# Patient Record
Sex: MALE | Race: NATIVE HAWAIIAN OR OTHER PACIFIC ISLANDER | URBAN - METROPOLITAN AREA
[De-identification: names, ages, dates, MRNs, and addresses within clinical notes are randomized per-mention and may not be internally consistent; named-entity substitution may affect disease eponyms.]

---

## 2019-11-25 ENCOUNTER — OFFICE VISIT (OUTPATIENT)
Dept: URBAN - METROPOLITAN AREA CLINIC 17 | Facility: CLINIC | Age: 69
End: 2019-11-25
Payer: MEDICARE

## 2019-11-25 DIAGNOSIS — H26.491 OTHER SECONDARY CATARACT, RIGHT EYE: ICD-10-CM

## 2019-11-25 PROCEDURE — 92004 COMPRE OPH EXAM NEW PT 1/>: CPT | Performed by: OPHTHALMOLOGY

## 2019-11-25 ASSESSMENT — VISUAL ACUITY
OS: 20/40
OD: 20/40

## 2019-11-25 ASSESSMENT — INTRAOCULAR PRESSURE
OD: 10
OS: 11

## 2019-11-25 NOTE — IMPRESSION/PLAN
Impression: Puckering of macula, left eye: H35.372. Plan: Discussed diagnosis in detail with patient. Advised patient conditino can get worse after cataract surgery and will need to monitor closes after surgery. Will also have patient use steroid and NSAID for 6-8 weeks after surgery.

## 2019-11-25 NOTE — IMPRESSION/PLAN
Impression: Combined forms of age-related cataract, left eye: H25.812. Condition: established, worsening. Symptoms: could improve with surgery. Vision: vision affected. Plan: Cataract accounts for patient's complaints. Reviewed risks, benefits, and procedure. Patient desires surgery, schedule ce/iol OS, RL2, standard lens, distance refractive target, patient is clear for surgery in [place] [id number]. Advised pt he may have limited visual improvement in OS due to ERM in OS. Pt to use NSAID and steroid for 6-8 weeks after surgery.

## 2019-12-20 ENCOUNTER — PRE-OPERATIVE VISIT (OUTPATIENT)
Dept: URBAN - METROPOLITAN AREA CLINIC 17 | Facility: CLINIC | Age: 69
End: 2019-12-20
Payer: MEDICARE

## 2019-12-20 DIAGNOSIS — H25.812 COMBINED FORMS OF AGE-RELATED CATARACT, LEFT EYE: Primary | ICD-10-CM

## 2019-12-20 RX ORDER — OFLOXACIN 3 MG/ML
0.3 % SOLUTION/ DROPS OPHTHALMIC
Qty: 5 | Refills: 1 | Status: INACTIVE
Start: 2019-12-20 | End: 2021-01-01

## 2019-12-20 RX ORDER — PREDNISOLONE ACETATE 10 MG/ML
1 % SUSPENSION/ DROPS OPHTHALMIC
Qty: 10 | Refills: 1 | Status: INACTIVE
Start: 2019-12-20 | End: 2021-01-01

## 2019-12-20 RX ORDER — KETOROLAC TROMETHAMINE 5 MG/ML
0.5 % SOLUTION OPHTHALMIC
Qty: 1 | Refills: 1 | Status: INACTIVE
Start: 2019-12-20 | End: 2021-01-01

## 2019-12-20 ASSESSMENT — PACHYMETRY
OS: 2.33
OD: 3.12
OS: 23.86
OD: 23.39

## 2019-12-30 ENCOUNTER — SURGERY (OUTPATIENT)
Dept: URBAN - METROPOLITAN AREA SURGERY 7 | Facility: SURGERY | Age: 69
End: 2019-12-30
Payer: MEDICARE

## 2019-12-30 PROCEDURE — 66821 AFTER CATARACT LASER SURGERY: CPT | Performed by: OPHTHALMOLOGY

## 2020-01-06 ENCOUNTER — POST-OPERATIVE VISIT (OUTPATIENT)
Dept: URBAN - METROPOLITAN AREA CLINIC 17 | Facility: CLINIC | Age: 70
End: 2020-01-06

## 2020-01-06 DIAGNOSIS — Z09 ENCNTR FOR F/U EXAM AFT TRTMT FOR COND OTH THAN MALIG NEOPLM: Primary | ICD-10-CM

## 2020-01-06 PROCEDURE — 99024 POSTOP FOLLOW-UP VISIT: CPT | Performed by: OPTOMETRIST

## 2020-01-06 ASSESSMENT — VISUAL ACUITY: OD: 20/30

## 2020-01-06 ASSESSMENT — INTRAOCULAR PRESSURE
OD: 10
OS: 11

## 2020-01-13 ENCOUNTER — SURGERY (OUTPATIENT)
Dept: URBAN - METROPOLITAN AREA SURGERY 7 | Facility: SURGERY | Age: 70
End: 2020-01-13
Payer: MEDICARE

## 2021-01-01 ENCOUNTER — TESTING ONLY (OUTPATIENT)
Dept: URBAN - METROPOLITAN AREA CLINIC 17 | Facility: CLINIC | Age: 71
End: 2021-01-01

## 2021-01-01 ENCOUNTER — OFFICE VISIT (OUTPATIENT)
Dept: URBAN - METROPOLITAN AREA CLINIC 17 | Facility: CLINIC | Age: 71
End: 2021-01-01
Payer: MEDICARE

## 2021-01-01 DIAGNOSIS — H52.4 PRESBYOPIA: Primary | ICD-10-CM

## 2021-01-01 DIAGNOSIS — H35.372 PUCKERING OF MACULA, LEFT EYE: Primary | ICD-10-CM

## 2021-01-01 DIAGNOSIS — E11.3551 TYPE 2 DIABETES WITH STABLE PROLIF DIABETIC RTNOP, RIGHT EYE: ICD-10-CM

## 2021-01-01 DIAGNOSIS — E11.3392 TYPE 2 DIAB W MODERATE NONPRLF DIAB RTNOP W/O MACULAR EDEMA, LEFT EYE: ICD-10-CM

## 2021-01-01 PROCEDURE — 92134 CPTRZ OPH DX IMG PST SGM RTA: CPT | Performed by: OPHTHALMOLOGY

## 2021-01-01 PROCEDURE — 92014 COMPRE OPH EXAM EST PT 1/>: CPT | Performed by: OPHTHALMOLOGY

## 2021-01-01 PROCEDURE — 92235 FLUORESCEIN ANGRPH MLTIFRAME: CPT | Performed by: OPHTHALMOLOGY

## 2021-01-01 ASSESSMENT — INTRAOCULAR PRESSURE
OD: 9
OD: 8
OS: 10
OS: 8

## 2021-01-01 ASSESSMENT — VISUAL ACUITY
OD: 20/25
OS: 20/50

## 2021-03-09 NOTE — IMPRESSION/PLAN
Impression: Type 2 diabetes with stable prolif diabetic rtnop, right eye: e11.3551. Plan:  OD s/p PPV/PRP -- looks good today -- stable No NV on FA today BG/BP control Note to PCP  Recheck 6m OCT/exam

## 2021-03-09 NOTE — IMPRESSION/PLAN
Impression: Type 2 diab w moderate nonprlf diab rtnop w/o macular edema, left eye: E72.6785. Plan:  No NV on FA
  BG/BP control   Hold on treatment today

## 2021-03-09 NOTE — IMPRESSION/PLAN
Impression: Puckering of macula, left eye: H35.372. Plan:  ERM OS with some metamorphopsia which he notes does not bother him He is most concerned with longstanding (~1 year) vertical binocular diplopia (stroke workup negative) -- he says he was fitted for prism which did help Since he is a poor surgical candidate and he does not want PPV now, will have him try glasses with prism to see if this improves his diplopia  Recheck here OCT/exam 3m, sooner prn

## 2021-07-27 NOTE — IMPRESSION/PLAN
Impression: Puckering of macula, left eye: H35.372. Plan: ERM OS with some metamorphopsia which he notes does not bother him Long discussion re PPV OS -- he does not want surgery now Diplopia much improved with glasses  Recheck here OCT/exam 6m, sooner prn